# Patient Record
Sex: FEMALE | Race: WHITE
[De-identification: names, ages, dates, MRNs, and addresses within clinical notes are randomized per-mention and may not be internally consistent; named-entity substitution may affect disease eponyms.]

---

## 2017-01-06 ENCOUNTER — HOSPITAL ENCOUNTER (OUTPATIENT)
Dept: HOSPITAL 45 - C.LABPVFM | Age: 77
Discharge: HOME | End: 2017-01-06
Attending: INTERNAL MEDICINE
Payer: COMMERCIAL

## 2017-01-06 DIAGNOSIS — Z00.00: Primary | ICD-10-CM

## 2017-01-06 DIAGNOSIS — R73.03: ICD-10-CM

## 2017-01-06 DIAGNOSIS — E78.00: ICD-10-CM

## 2017-01-06 DIAGNOSIS — I10: ICD-10-CM

## 2017-01-06 LAB
ALBUMIN/GLOB SERPL: 1.5 {RATIO} (ref 0.9–2)
ALP SERPL-CCNC: 45 U/L (ref 45–117)
ALT SERPL-CCNC: 48 U/L (ref 12–78)
ANION GAP SERPL CALC-SCNC: 9 MMOL/L (ref 3–11)
AST SERPL-CCNC: 21 U/L (ref 15–37)
BUN SERPL-MCNC: 19 MG/DL (ref 7–18)
BUN/CREAT SERPL: 22.9 (ref 10–20)
CALCIUM SERPL-MCNC: 8.7 MG/DL (ref 8.5–10.1)
CHLORIDE SERPL-SCNC: 106 MMOL/L (ref 98–107)
CHOLEST/HDLC SERPL: 3.4 {RATIO}
CO2 SERPL-SCNC: 27 MMOL/L (ref 21–32)
CREAT SERPL-MCNC: 0.81 MG/DL (ref 0.6–1.2)
EST. AVERAGE GLUCOSE BLD GHB EST-MCNC: 123 MG/DL
GLOBULIN SER-MCNC: 2.6 GM/DL (ref 2.5–4)
GLUCOSE SERPL-MCNC: 125 MG/DL (ref 70–99)
GLUCOSE UR QL: 41 MG/DL
KETONES UR QL STRIP: 66 MG/DL
NITRITE UR QL STRIP: 168 MG/DL (ref 0–150)
PH UR: 141 MG/DL (ref 0–200)
POTASSIUM SERPL-SCNC: 4.3 MMOL/L (ref 3.5–5.1)
SODIUM SERPL-SCNC: 142 MMOL/L (ref 136–145)
VERY LOW DENSITY LIPOPROT CALC: 34 MG/DL

## 2017-01-10 NOTE — CODING QUERY MEDICAL NECESSITY
SUPPORTING DIAGNOSIS NEEDED





A supporting diagnosis is required for the test/procedure performed on this patient in 
order for us to be reimbursed by the patient's insurance. Please provide a supporting 
diagnosis for the following test/procedure listed below next to the test name along with 
your signature. 



*If there is no additional diagnosis for this patient that would support the following 
test/procedure please document that below next to the test/procedure.



Test(s)/Procedure(s) that require a supporting diagnosis:

DOS 1/6

* Hba1c      DIAGNOSIS:



Provider Signature:  ______________________________  Date:  _______



Thank you  

Parvin Hahn

Health Information Management

Phone:  [phone]

Fax:  [phone]



Once completed, please kindly fax back to [phone]



For questions please call [phone]

## 2017-06-14 ENCOUNTER — HOSPITAL ENCOUNTER (OUTPATIENT)
Dept: HOSPITAL 45 - X.SURG | Age: 77
Discharge: HOME | End: 2017-06-14
Attending: SPECIALIST
Payer: COMMERCIAL

## 2017-06-14 VITALS
WEIGHT: 163.34 LBS | HEIGHT: 65 IN | WEIGHT: 163.34 LBS | HEIGHT: 65 IN | BODY MASS INDEX: 27.21 KG/M2 | BODY MASS INDEX: 27.21 KG/M2

## 2017-06-14 VITALS — DIASTOLIC BLOOD PRESSURE: 75 MMHG | HEART RATE: 67 BPM | OXYGEN SATURATION: 95 % | SYSTOLIC BLOOD PRESSURE: 133 MMHG

## 2017-06-14 VITALS — TEMPERATURE: 97.52 F

## 2017-06-14 DIAGNOSIS — E11.36: ICD-10-CM

## 2017-06-14 DIAGNOSIS — H25.11: Primary | ICD-10-CM

## 2017-06-14 DIAGNOSIS — I10: ICD-10-CM

## 2017-06-14 RX ADMIN — MOXIFLOXACIN HYDROCHLORIDE SCH DROP: 5 SOLUTION/ DROPS OPHTHALMIC at 06:38

## 2017-06-14 RX ADMIN — TROPICAMIDE SCH DROP: 10 SOLUTION/ DROPS OPHTHALMIC at 06:36

## 2017-06-14 RX ADMIN — TROPICAMIDE SCH DROP: 10 SOLUTION/ DROPS OPHTHALMIC at 06:41

## 2017-06-14 RX ADMIN — PHENYLEPHRINE HYDROCHLORIDE SCH DROP: 25 SOLUTION/ DROPS OPHTHALMIC at 06:35

## 2017-06-14 RX ADMIN — MOXIFLOXACIN HYDROCHLORIDE SCH DROP: 5 SOLUTION/ DROPS OPHTHALMIC at 06:47

## 2017-06-14 RX ADMIN — CYCLOPENTOLATE HYDROCHLORIDE SCH DROP: 10 SOLUTION/ DROPS OPHTHALMIC at 06:42

## 2017-06-14 RX ADMIN — CYCLOPENTOLATE HYDROCHLORIDE SCH DROP: 10 SOLUTION/ DROPS OPHTHALMIC at 06:37

## 2017-06-14 RX ADMIN — PHENYLEPHRINE HYDROCHLORIDE SCH DROP: 25 SOLUTION/ DROPS OPHTHALMIC at 06:41

## 2017-06-14 NOTE — ANESTHESIA PROGRESS NT - [facility]
Anesthesia Post Op Note


Date & Time


Jun 14, 2017 at 07:47





Vital Signs


Pain Intensity:  0





Vital Signs Past 12 Hours








  Date Time  Temp Pulse Resp B/P (MAP) Pulse Ox O2 Delivery O2 Flow Rate FiO2


 


6/14/17 07:44  67 16 133/75 (94) 95 Room Air  


 


6/14/17 07:22 36.4 73 16 133/81 (98) 96 Room Air  


 


6/14/17 06:26 37.0 74 18 173/94 (120) 96 Room Air  











Notes


Mental Status:  alert / awake / arousable, participated in evaluation


Pt Amnestic to Procedure:  Yes


Nausea / Vomiting:  adequately controlled


Pain:  adequately controlled


Airway Patency, RR, SpO2:  stable & adequate


BP & HR:  stable & adequate


Hydration State:  stable & adequate


Anesthetic Complications:  no major complications apparent

## 2017-06-14 NOTE — DISCHARGE INSTRUCTIONS-SURGCTR
Discharge Instructions


Date of Service


Jun 14, 2017.





Visit


Reason for Visit:  Cataract Right Eye





Discharge


Discharge Diagnosis / Problem:  lens implant right eye





Discharge Goals


Goal(s):  Improve function





Activity Recommendations


Activity Limitations:  resume your previous activity


Lifting Limitations:  no more than 10 pounds


Exercise/Sports Limitations:  gradually increase as tolerated


May Resume Sexual Activity:  when tolerated


Shower/Bathe:  tomorrow


Driving or Machine Use:  resume 1 day after discharge





Anesthesia


.





Post Anesthesia Instructions:





If you have had General Anesthesia or IV Sedation:





*  Do not drive today.


*  Resume driving when surgeon permits.


*  Do not make important decisions or sign legal documents today.


*  Call surgeon for:





   1.  Temperature elevations greater than 101 degrees F.


   2.  Uncontrollable pain.


   3.  Excessive bleeding.


   4.  Persistent nausea and vomiting.


   5.  Medication intolerance (nausea, vomiting or rash).





*  For nausea and vomiting use only clear liquids such as: tea, soda, bouillon 

until nausea subsides, then gradually increase diet as tolerated.





*  If you have any concerns or questions, call your surgeon's office.  If 

physician is unavailable and it is an emergency, call 911 or go to the nearest 

emergency room.





.





Instructions / Follow-Up


Instructions / Follow-Up





ACTIVITY RECOMMENDATIONS:





*  Light activities.





*  Mild irritation and blurred vision are common for the first few days.





*  You may walk outside, read, watch television.





*  Redness around the white part of the eye is common.








MEDICATIONS:





Resume previous medications unless instructed otherwise by your surgeon.





*  Take white Diamox (Acetazolamide) tablet at 1 pm today.





Start all eye drops at 1 pm today:





*  Eye drops (today and tomorrow):


   Durezol - one drop in operative eye every 3 hours while awake


            Ofloxacin - one drop in operative eye every  3 hours while awake


   Ilevro - one drop in operative eye once a day








SPECIAL CARE INSTRUCTIONS:





*  Tape plastic shield over eye to sleep at night.  





Call your doctor at [phone] with any concerns or problems.








FOLLOW UP VISIT:





Follow-up with Dr Samano at [place] office as scheduled.





Diet Recommendations


Home Diet:  no limitations





Procedures


Procedures Performed:  


cataract extraction with lens implant





Pending Studies


Studies pending at discharge:  no





Medical Emergencies








.


Who to Call and When:





Medical Emergencies:  If at any time you feel your situation is an emergency, 

please call 911 immediately.





.





Non-Emergent Contact


Non-Emergency issues call your:  Ophthalmologist


Call Non-Emergent contact if:  your pain is not controlled


[phone]


.


.








"Provider Documentation" section prepared by Wang Samano.








.

## 2017-06-14 NOTE — [facility] OPERATIVE REPORT
Operative Report


Date of Service


Jun 14, 2017.





Operative Report


1.  PREOPERATIVE DIAGNOSIS:  Senile nuclear cataract, right eye.  





2.  POSTOPERATIVE DIAGNOSIS:  Senile nuclear cataract, right eye.  





3.  PROCEDURE:  Phacoemulsification of right cataract with posterior chamber 

lens implant, type Bausch [employer] Lomb, model MI60L, power +23.5 diopters. 





ANESTHESIA:   Local standby.  SURGEON:  Dr. Samano. COMPLICATIONS:  None.  

OPERATING TIME:  10 minutes.  





4.  OPERATION AND FINDINGS: 





DESCRIPTION OF PROCEDURE:  The right pupil was dilated.  The anesthetic was 

administered using a topical technique.  The right eye was prepped and draped.  

A speculum was placed.  A clear corneal incision was formed.  The chamber was 

filled with Amvisc Plus and Endocoat.  Epinephrine solution was used.  A 

paracentesis was placed.  A capsulorrhexis was performed.  The nucleus was 

hydrodissected.  The lens was removed with phacoemulsification.  Time was 3.86 

seconds.  The aspiration unit was used to remove the cortex.  The capsule was 

filled with Amvisc Plus.  The lens implant was folded and placed into the 

capsule.  The incision was hydrated.  The Amvisc was aspirated.  The wound was 

secure.  The chamber was deep.  The pupil was round.  Brimonidine, TobraDex 

ointment and Vigamox solution were placed.  The speculum was removed.  The 

patient was returned to the Recovery Room in stable condition.


I attest to the content of the Intraoperative Record and any orders documented 

therein.  Any exceptions are noted below.


The scribe's documentation has been prepared in my presence, under my direction 

and personally reviewed by me in its entirety. I confirm that the note above 

accurately reflects all work, treatment, procedures, and medical decision 

making performed by me.








I personally scribed for Wang Samano M.D. ([email or web address]) on 6/14/17 at 07:17.  

Electronically submitted by Shannan Marcano (JOSE DAVID).

## 2017-06-28 ENCOUNTER — HOSPITAL ENCOUNTER (OUTPATIENT)
Dept: HOSPITAL 45 - X.SURG | Age: 77
Discharge: HOME | End: 2017-06-28
Attending: SPECIALIST
Payer: COMMERCIAL

## 2017-06-28 VITALS
BODY MASS INDEX: 27.21 KG/M2 | BODY MASS INDEX: 27.21 KG/M2 | HEIGHT: 65 IN | WEIGHT: 163.34 LBS | HEIGHT: 65 IN | WEIGHT: 163.34 LBS

## 2017-06-28 VITALS — DIASTOLIC BLOOD PRESSURE: 82 MMHG | OXYGEN SATURATION: 96 % | SYSTOLIC BLOOD PRESSURE: 143 MMHG | HEART RATE: 63 BPM

## 2017-06-28 VITALS — TEMPERATURE: 97.88 F

## 2017-06-28 DIAGNOSIS — I10: ICD-10-CM

## 2017-06-28 DIAGNOSIS — H25.12: Primary | ICD-10-CM

## 2017-06-28 DIAGNOSIS — E11.36: ICD-10-CM

## 2017-06-28 RX ADMIN — MOXIFLOXACIN HYDROCHLORIDE SCH DROP: 5 SOLUTION/ DROPS OPHTHALMIC at 06:49

## 2017-06-28 RX ADMIN — CYCLOPENTOLATE HYDROCHLORIDE SCH DROP: 10 SOLUTION/ DROPS OPHTHALMIC at 06:43

## 2017-06-28 RX ADMIN — TROPICAMIDE SCH DROP: 10 SOLUTION/ DROPS OPHTHALMIC at 06:37

## 2017-06-28 RX ADMIN — PHENYLEPHRINE HYDROCHLORIDE SCH DROP: 25 SOLUTION/ DROPS OPHTHALMIC at 06:36

## 2017-06-28 RX ADMIN — CYCLOPENTOLATE HYDROCHLORIDE SCH DROP: 10 SOLUTION/ DROPS OPHTHALMIC at 06:38

## 2017-06-28 RX ADMIN — MOXIFLOXACIN HYDROCHLORIDE SCH DROP: 5 SOLUTION/ DROPS OPHTHALMIC at 06:39

## 2017-06-28 RX ADMIN — TROPICAMIDE SCH DROP: 10 SOLUTION/ DROPS OPHTHALMIC at 06:42

## 2017-06-28 RX ADMIN — PHENYLEPHRINE HYDROCHLORIDE SCH DROP: 25 SOLUTION/ DROPS OPHTHALMIC at 06:41

## 2017-06-28 NOTE — [facility] OPERATIVE REPORT
Operative Report


Date of Service


Jun 28, 2017.





Operative Report


1.  PREOPERATIVE DIAGNOSIS:  Senile nuclear cataract, left eye.  





2.  POSTOPERATIVE DIAGNOSIS:  Senile nuclear cataract, left eye.  





3.  PROCEDURE:  Phacoemulsification of left cataract with posterior chamber 

lens implant, type Bausch [employer] Lomb, model MI60L, power +24.5 diopters. 





ANESTHESIA:   Local standby.  SURGEON:  Dr. Samano. COMPLICATIONS:  None.  

OPERATING TIME:  10 minutes.  





4.  OPERATION AND FINDINGS: 





DESCRIPTION OF PROCEDURE:  The left pupil was dilated.  The anesthetic was 

administered using a topical technique.  The left eye was prepped and draped.  

A speculum was placed.  A clear corneal incision was formed.  The chamber was 

filled with Amvisc Plus and Endocoat.  Epinephrine solution was used.  A 

paracentesis was placed.  A capsulorrhexis was performed.  The nucleus was 

hydrodissected.  The lens was removed with phacoemulsification.  Time was 3.66 

seconds.  The aspiration unit was used to remove the cortex.  The capsule was 

filled with Amvisc Plus.  The lens implant was folded and placed into the 

capsule.  The incision was hydrated.  The Amvisc was aspirated.  The wound was 

secure.  The chamber was deep.  The pupil was round.  Brimonidine, TobraDex 

ointment and Vigamox solution were placed.  The speculum was removed.  The 

patient was returned to the Recovery Room in stable condition.


I attest to the content of the Intraoperative Record and any orders documented 

therein.  Any exceptions are noted below.


The scribe's documentation has been prepared in my presence, under my direction 

and personally reviewed by me in its entirety. I confirm that the note above 

accurately reflects all work, treatment, procedures, and medical decision 

making performed by me.








I personally scribed for Wang Samano M.D. ([email or web address]) on 6/28/17 at 07:12.  

Electronically submitted by Shannan Marcano ([email or web address]).

## 2017-06-28 NOTE — DISCHARGE INSTRUCTIONS-SURGCTR
Discharge Instructions


Date of Service


Jun 28, 2017.





Visit


Reason for Visit:  Cataract Left Eye





Discharge


Discharge Diagnosis / Problem:  lens implant left eye





Discharge Goals


Goal(s):  Improve function





Activity Recommendations


Activity Limitations:  resume your previous activity


Lifting Limitations:  no more than 10 pounds


Exercise/Sports Limitations:  gradually increase as tolerated


May Resume Sexual Activity:  when tolerated


Shower/Bathe:  tomorrow


Driving or Machine Use:  resume 1 day after discharge





Anesthesia


.





Post Anesthesia Instructions:





If you have had General Anesthesia or IV Sedation:





*  Do not drive today.


*  Resume driving when surgeon permits.


*  Do not make important decisions or sign legal documents today.


*  Call surgeon for:





   1.  Temperature elevations greater than 101 degrees F.


   2.  Uncontrollable pain.


   3.  Excessive bleeding.


   4.  Persistent nausea and vomiting.


   5.  Medication intolerance (nausea, vomiting or rash).





*  For nausea and vomiting use only clear liquids such as: tea, soda, bouillon 

until nausea subsides, then gradually increase diet as tolerated.





*  If you have any concerns or questions, call your surgeon's office.  If 

physician is unavailable and it is an emergency, call 911 or go to the nearest 

emergency room.





.





Instructions / Follow-Up


Instructions / Follow-Up





ACTIVITY RECOMMENDATIONS:





*  Light activities.





*  Mild irritation and blurred vision are common for the first few days.





*  You may walk outside, read, watch television.





*  Redness around the white part of the eye is common.








MEDICATIONS:





Resume previous medications unless instructed otherwise by your surgeon.





*  Take white Diamox (Acetazolamide) tablet at 1 pm today.





Start all eye drops at 1 pm today:





*  Eye drops (today and tomorrow):


   Durezol - one drop in operative eye every 3 hours while awake


            Ofloxacin - one drop in operative eye every  3 hours while awake


   


SPECIAL CARE INSTRUCTIONS:





*  Tape plastic shield over eye to sleep at night.  





Call your doctor at [phone] with any concerns or problems.








FOLLOW UP VISIT:





Follow-up with Dr Samano at [facility] as scheduled.





Diet Recommendations


Home Diet:  no limitations





Procedures


Procedures Performed:  


Left Cataract Phacoemulsification With Intraocular Lens Implant





Pending Studies


Studies pending at discharge:  no





Medical Emergencies








.


Who to Call and When:





Medical Emergencies:  If at any time you feel your situation is an emergency, 

please call 911 immediately.





.





Non-Emergent Contact


Non-Emergency issues call your:  Ophthalmologist


Call Non-Emergent contact if:  your pain is not controlled


[phone]


.


.








"Provider Documentation" section prepared by Wang Samano.








.

## 2017-06-28 NOTE — ANESTHESIA PROGRESS NT - [facility]
Anesthesia Post Op Note


Date & Time


Jun 28, 2017 at 07:25





Vital Signs


Pain Intensity:  0





Vital Signs Past 12 Hours








  Date Time  Temp Pulse Resp B/P (MAP) Pulse Ox O2 Delivery O2 Flow Rate FiO2


 


6/28/17 07:14 36.6 73 16 133/84 (100) 95 Room Air  


 


6/28/17 06:26 36.9 79 16 168/90 (116) 95 Room Air  











Notes


Mental Status:  alert / awake / arousable, participated in evaluation


Pt Amnestic to Procedure:  Yes


Nausea / Vomiting:  adequately controlled


Pain:  adequately controlled


Airway Patency, RR, SpO2:  stable & adequate


BP & HR:  stable & adequate


Hydration State:  stable & adequate


Anesthetic Complications:  no major complications apparent

## 2017-07-31 ENCOUNTER — HOSPITAL ENCOUNTER (OUTPATIENT)
Dept: HOSPITAL 45 - C.GI | Age: 77
Discharge: HOME | End: 2017-07-31
Attending: INTERNAL MEDICINE
Payer: COMMERCIAL

## 2017-07-31 VITALS
BODY MASS INDEX: 27.21 KG/M2 | HEIGHT: 65 IN | HEIGHT: 65 IN | WEIGHT: 163.34 LBS | WEIGHT: 163.34 LBS | BODY MASS INDEX: 27.21 KG/M2

## 2017-07-31 VITALS — SYSTOLIC BLOOD PRESSURE: 132 MMHG | DIASTOLIC BLOOD PRESSURE: 79 MMHG | HEART RATE: 67 BPM | OXYGEN SATURATION: 96 %

## 2017-07-31 VITALS — TEMPERATURE: 97.7 F

## 2017-07-31 DIAGNOSIS — D12.3: ICD-10-CM

## 2017-07-31 DIAGNOSIS — F41.9: ICD-10-CM

## 2017-07-31 DIAGNOSIS — Z12.11: Primary | ICD-10-CM

## 2017-07-31 DIAGNOSIS — I10: ICD-10-CM

## 2017-07-31 DIAGNOSIS — K64.8: ICD-10-CM

## 2017-07-31 DIAGNOSIS — Z79.82: ICD-10-CM

## 2017-07-31 DIAGNOSIS — Z87.891: ICD-10-CM

## 2017-07-31 DIAGNOSIS — R73.03: ICD-10-CM

## 2017-07-31 DIAGNOSIS — E78.5: ICD-10-CM

## 2017-07-31 NOTE — ANESTHESIOLOGY PROGRESS NOTE
Anesthesia Post Op Note


Date & Time


Jul 31, 2017 at 14:37





Vital Signs


Pain Intensity:  0





Vital Signs Past 12 Hours








  Date Time  Temp Pulse Resp B/P (MAP) Pulse Ox O2 Delivery O2 Flow Rate FiO2


 


7/31/17 14:26  62 16 155/78 (103) 93 Room Air  


 


7/31/17 14:11  65 16 136/73 (94) 94 Room Air  


 


7/31/17 13:17 36.5 71 18 134/79 (97) 95 Room Air  











Notes


Mental Status:  alert / awake / arousable, participated in evaluation


Pt Amnestic to Procedure:  Yes


Nausea / Vomiting:  adequately controlled


Pain:  adequately controlled


Airway Patency, RR, SpO2:  stable & adequate


BP & HR:  stable & adequate


Hydration State:  stable & adequate


Anesthetic Complications:  no major complications apparent

## 2017-07-31 NOTE — GI REPORT
Procedure Date: 7/31/2017 1:13 PM

Procedure:            Colonoscopy

Indications:          Screening for colorectal malignant neoplasm

Medicines:            Monitored Anesthesia Care

Complications:        No immediate complications.

Estimated Blood Loss: Estimated blood loss: none.

Procedure:            Pre-Anesthesia Assessment:

                      - Prior to the procedure, a History and Physical was 

                      performed, and patient medications and allergies were 

                      reviewed. The patient's tolerance of previous 

                      anesthesia was also reviewed. The risks and benefits of 

                      the procedure and the sedation options and risks were 

                      discussed with the patient. All questions were 

                      answered, and informed consent was obtained. Prior 

                      Anticoagulants: The patient has taken aspirin, last 

                      dose was 1 day prior to procedure. ASA Grade 

                      Assessment: III - A patient with severe systemic 

                      disease. After reviewing the risks and benefits, the 

                      patient was deemed in satisfactory condition to undergo 

                      the procedure.

                      After I obtained informed consent, the scope was passed 

                      under direct vision. Throughout the procedure, the 

                      patient's blood pressure, pulse, and oxygen saturations 

                      were monitored continuously. The scope was introduced 

                      through the anus and advanced to the terminal ileum. 

                      The colonoscopy was performed without difficulty. The 

                      patient tolerated the procedure well. The quality of 

                      the bowel preparation was good. The ileocecal valve, 

                      appendiceal orifice, and rectum were photographed.

Findings:

     Two sessile polyps were found in the transverse colon. The polyps were 4 

     to 6 mm in size. These polyps were removed with a hot snare. Resection 

     and retrieval were complete.

     Non-bleeding internal hemorrhoids were found during retroflexion. The 

     hemorrhoids were small.

Impression:           - Two 4 to 6 mm polyps in the transverse colon, removed 

                      with a hot snare. Resected and retrieved.

                      - Non-bleeding internal hemorrhoids.

Recommendation:       - Resume previous diet.

                      - Continue present medications.

                      - Await pathology results.

                      - Repeat colonoscopy for surveillance based on 

                      pathology results.

                      - Return to primary care physician as previously 

                      scheduled.

Milton Arellano DO

7/31/2017 2:17:47 PM

This report has been signed electronically.

Note Initiated On: 7/31/2017 1:13 PM

     I attest to the content of the Intraoperative Record and orders 

     documented therein, exceptions below

## 2017-07-31 NOTE — ENDO HISTORY AND PHYSICAL
History & Physical


Date of Service:


Jul 31, 2017.


Chief Complaint:


screening


Referring Physician:


Dr. Jauregui


History of Present Illness


77 yo CF who presents for screening colonoscopy.





Past Surgical History


Hx Cardiac Surgery:  No


Hx Internal Defibrillator:  No


Hx Pacemaker:  No


Hx Abdominal Surgery:  Yes (BILATERAL TUBAL LIGATION)


Hx of Implantable Prosthesis:  No


Hx Post-Op Nausea and Vomiting:  No


Hx Cancer Surgery:  No


Hx Thoracic Surgery:  No


Hx Orthopedic:  Yes (LEFT WRIST FX REPAIR)


Hx Urinary Tract Surgery:  No





Family History


None





Social History


Smoking Status:  Former Smoker


Hx Substance Use:  No


Hx Alcohol Use:  Yes (RARELY WILL DRINK WINE)





Allergies


Coded Allergies:  


     No Known Allergies (Unverified , 7/31/17)





Current Medications





Reported Home Medications








 Medications  Dose


 Route/Sig


 Max Daily Dose Days Date Category


 


 [Zinc]    1 Tab


 PO QAM


    6/7/17 Reported


 


 [Vitamin C]    1 Tab


 PO QAM


    6/7/17 Reported


 


 Vitamin D


  (Cholecalciferol)


 2,000 Unit Cap  1 Cap


 PO QAM


    6/7/17 Reported


 


 Simvastatin 40 Mg


 Tab  1 Tab


 PO HS


    6/7/17 Reported


 


 Preservision


 Areds (Multiple


 Vitamins W/


 Minerals) 1 Cap


 Cap  1 Cap


 PO BID


    6/7/17 Reported


 


 Omega-3 (Fish


 Oil) 1 Ea Cap  1 Cap


 PO BID


    6/7/17 Reported


 


 Multivitamin


  (Multivitamins)


 Tab  1 Tab


 PO QAM


    6/7/17 Reported


 


 Glucophage


  (Metformin Hcl)


 500 Mg Tab  0.5 Tab


 PO QAM


    6/7/17 Reported


 


 Meclizine Hcl 25


 Mg Tab  1 Tab


 PO TID PRN


    6/7/17 Reported


 


 Normodyne


  (Labetalol HCl)


 300 Mg Tab  300 Mg


 PO BID


    6/7/17 Reported


 


 Calcium (Calcium


 Carbonate) 600 Mg


 Tab  1 Tab


 PO QAM


    6/7/17 Reported


 


 Vitamin B Complex


  (B-Complex


 Vitamins) 1 Tab


 Tab  2 Tab


 PO QAM


    6/7/17 Reported


 


 Aspirin Chewable


  (Aspirin) 81 Mg


 Chew  81 Mg


 PO QAM


    6/7/17 Reported











Vital Signs


Weight (Kilograms):  74.09


Height (Feet):  5


Height (Inches):  5











  Date Time  Temp Pulse Resp B/P (MAP) Pulse Ox O2 Delivery O2 Flow Rate FiO2


 


7/31/17 13:17 36.5 71 18 134/79 (97) 95 Room Air  











Physical Exam


General Appearance:  WD/WN, no apparent distress


Respiratory/Chest:  


   Auscultation:  breath sounds normal


Cardiovascular:  


   Heart Auscultation:  RRR


Abdomen:  


   Bowel Sounds:  normal


   Inspection & Palpation:  soft, non-distended, no tenderness, guarding & 

rebound





Assessment and Plan


Assessment:


77 yo CF who presents for screening colonoscopy.








Plan:


Proceed with colonoscopy.

## 2017-07-31 NOTE — DISCHARGE INSTRUCTIONS
Endoscopy Patient Instructions


Date / Procedure(s) Performed


Jul 31, 2017.


Colonoscopy





Allergy Information


Coded Allergies:  


     No Known Allergies (Unverified , 7/31/17)





Discharge Date / Findings


Jul 31, 2017.


Colon polyps


Internal hemorrhoids





Medication Instructions


OK to resume all medications today as prescribed





Reported Home Medications








 Medications  Dose


 Route/Sig


 Max Daily Dose Days Date Category


 


 [Zinc]    1 Tab


 PO QAM


    6/7/17 Reported


 


 [Vitamin C]    1 Tab


 PO QAM


    6/7/17 Reported


 


 Vitamin D


  (Cholecalciferol)


 2,000 Unit Cap  1 Cap


 PO QAM


    6/7/17 Reported


 


 Simvastatin 40 Mg


 Tab  1 Tab


 PO HS


    6/7/17 Reported


 


 Preservision


 Areds (Multiple


 Vitamins W/


 Minerals) 1 Cap


 Cap  1 Cap


 PO BID


    6/7/17 Reported


 


 Omega-3 (Fish


 Oil) 1 Ea Cap  1 Cap


 PO BID


    6/7/17 Reported


 


 Multivitamin


  (Multivitamins)


 Tab  1 Tab


 PO QAM


    6/7/17 Reported


 


 Glucophage


  (Metformin Hcl)


 500 Mg Tab  0.5 Tab


 PO QAM


    6/7/17 Reported


 


 Meclizine Hcl 25


 Mg Tab  1 Tab


 PO TID PRN


    6/7/17 Reported


 


 Normodyne


  (Labetalol HCl)


 300 Mg Tab  300 Mg


 PO BID


    6/7/17 Reported


 


 Calcium (Calcium


 Carbonate) 600 Mg


 Tab  1 Tab


 PO QAM


    6/7/17 Reported


 


 Vitamin B Complex


  (B-Complex


 Vitamins) 1 Tab


 Tab  2 Tab


 PO QAM


    6/7/17 Reported


 


 Aspirin Chewable


  (Aspirin) 81 Mg


 Chew  81 Mg


 PO QAM


    6/7/17 Reported











Provider Instructions





Activity Restrictions





-  No exercising or heavy lifting for 24 hours. 


-  Do not drink alcohol the day of the procedure.


-  Do not drive a car or operate machinery until the day after the procedure.


-  Do not make any important decisions or sign important papers in 24 hours 

after the procedure.





Following Day:





-  Return to full activity which may include returning to work/school.





Diet





Start your diet with liquids and light foods (jello, soup, juice, toast).  Then 

eat your usual diet if not nauseated.





Treatment For Common After Affects





For mild abdominal pain, bloating, or excessive gas:





-  Rest


-  Eat lightly


-  Lie on right side





Follow-Up Information


Follow-up with Dr. Jauregui as scheduled





Anesthesia Information





What You Should Know





You have had a procedure that required some medicine to reduce anxiety and 

discomfort. This treatment is called moderate sedation.  


After receiving the treatment, you may be sleepy, but you will be able to 

breathe on your own.  The effects of the treatment may last for several hours.








Follow these instructions along with Activity/Diet recommendations noted above:





*  Do NOT do anything where dizziness or clumsiness would be dangerous.





*  Rest quietly at home today, then you can be up and about tomorrow.





*  Have a responsible person stay with you the rest of today.





*  You may have had an I.V. today.  If so, you may take the dressing off later 

today.





Recommendations


 


Call your doctor if:





*  Trouble breathing 





*  Continuous vomiting for more than 24 hours








*  Temperature above 101 degrees





*  Severe abdominal pain or bloating





*  Pain not relieved by pain medicine ordered





*  There is increased drainage or redness from any incision





*  A large amount of rectal bleeding greater than 2-3 tablespoons. 


   (If you had a polyp/s removed or have hemorrhoids, a small amount of blood -


    from the rectum is to be expected.)





*  You have any unanswered questions or concerns.








IN THE EVENT OF A SERIOUS EMERGENCY, GO TO THE NEAREST EMERGENCY ROOM








       Your discharge instructions were prepared by provider Milton Arellano.





 Patient Instructions Signature Page














Marce Mathew 











Patient (or Guardian) Signature/Date:____________________________________ I 

have read and understand the instructions given to me by my caregivers.








Caregiver/RN/Doctor Signature/Date:____________________________________











The above-named patient and/or guardian has received patient instructions on 

this date.





























+  Original Patient Signature Page (only) stays with chart.  Please make copy 

for patient.

## 2017-09-06 ENCOUNTER — HOSPITAL ENCOUNTER (OUTPATIENT)
Dept: HOSPITAL 45 - C.LABPVFM | Age: 77
Discharge: HOME | End: 2017-09-06
Attending: NURSE PRACTITIONER
Payer: COMMERCIAL

## 2017-09-06 DIAGNOSIS — E55.9: ICD-10-CM

## 2017-09-06 DIAGNOSIS — E78.00: ICD-10-CM

## 2017-09-06 DIAGNOSIS — R73.03: ICD-10-CM

## 2017-09-06 DIAGNOSIS — I10: ICD-10-CM

## 2017-09-06 DIAGNOSIS — M81.0: Primary | ICD-10-CM

## 2017-09-06 LAB
ANION GAP SERPL CALC-SCNC: 7 MMOL/L (ref 3–11)
BUN SERPL-MCNC: 16 MG/DL (ref 7–18)
BUN/CREAT SERPL: 18.7 (ref 10–20)
CALCIUM SERPL-MCNC: 9.3 MG/DL (ref 8.5–10.1)
CHLORIDE SERPL-SCNC: 107 MMOL/L (ref 98–107)
CHOLEST/HDLC SERPL: 3.6 {RATIO}
CO2 SERPL-SCNC: 27 MMOL/L (ref 21–32)
CREAT SERPL-MCNC: 0.88 MG/DL (ref 0.6–1.2)
EST. AVERAGE GLUCOSE BLD GHB EST-MCNC: 123 MG/DL
GLUCOSE SERPL-MCNC: 131 MG/DL (ref 70–99)
GLUCOSE UR QL: 50 MG/DL
KETONES UR QL STRIP: 75 MG/DL
NITRITE UR QL STRIP: 264 MG/DL (ref 0–150)
PH UR: 178 MG/DL (ref 0–200)
POTASSIUM SERPL-SCNC: 4.2 MMOL/L (ref 3.5–5.1)
SODIUM SERPL-SCNC: 141 MMOL/L (ref 136–145)
VERY LOW DENSITY LIPOPROT CALC: 53 MG/DL

## 2017-10-23 ENCOUNTER — HOSPITAL ENCOUNTER (OUTPATIENT)
Dept: HOSPITAL 45 - C.MAMM | Age: 77
Discharge: HOME | End: 2017-10-23
Attending: NURSE PRACTITIONER
Payer: COMMERCIAL

## 2017-10-23 DIAGNOSIS — Z12.31: Primary | ICD-10-CM

## 2017-10-23 NOTE — MAMMOGRAPHY REPORT
BILATERAL DIGITAL SCREENING MAMMOGRAM WITH CAD: 10/23/2017

CLINICAL HISTORY: Routine screening examination.  





TECHNIQUE: Bilateral CC, MLO and repeat right MLO views were obtained.  Current study was also evalua
renee with a Computer Aided Detection (CAD) system.  



COMPARISON: Comparison is made to exams dated:  10/21/2016 mammogram, 10/19/2015 mammogram, 10/16/201
4 mammogram, 10/15/2013 mammogram, 10/25/2012 mammogram, and 10/25/2011 mammogram - [facility].   



BREAST COMPOSITION:  The tissue of both breasts is almost entirely fatty.  



FINDINGS: There is evidence of prior reduction mammoplasty.  No suspicious mass, architectural distor
tion or cluster of microcalcifications is seen.  



IMPRESSION:  ACR BI-RADS CATEGORY 1: NEGATIVE

There is no mammographic evidence of malignancy. A 1 year screening mammogram is recommended.  The pa
tient will receive written notification of the results.  





Approximately 10% of breast cancers are not detected with mammography. A negative mammographic report
 should not delay biopsy if a clinically suggestive mass is present.



Payton Gill M.D.          

ay/:10/23/2017 13:21:37  



Imaging Technologist: Leann ALONSO(R)(M), [facility]

letter sent: Normal 1/2  

BI-RADS Code: ACR BI-RADS Category 1: Negative

## 2018-02-08 ENCOUNTER — HOSPITAL ENCOUNTER (OUTPATIENT)
Dept: HOSPITAL 45 - C.LABPVFM | Age: 78
Discharge: HOME | End: 2018-02-08
Attending: NURSE PRACTITIONER
Payer: COMMERCIAL

## 2018-02-08 DIAGNOSIS — R73.03: ICD-10-CM

## 2018-02-08 DIAGNOSIS — I10: Primary | ICD-10-CM

## 2018-02-08 LAB
BUN SERPL-MCNC: 19 MG/DL (ref 7–18)
CALCIUM SERPL-MCNC: 9.7 MG/DL (ref 8.5–10.1)
CO2 SERPL-SCNC: 28 MMOL/L (ref 21–32)
CREAT SERPL-MCNC: 0.83 MG/DL (ref 0.6–1.2)
GLUCOSE SERPL-MCNC: 140 MG/DL (ref 70–99)
HBA1C MFR BLD: 6.1 % (ref 4.5–5.6)
POTASSIUM SERPL-SCNC: 4.4 MMOL/L (ref 3.5–5.1)
SODIUM SERPL-SCNC: 139 MMOL/L (ref 136–145)

## 2018-08-14 ENCOUNTER — HOSPITAL ENCOUNTER (OUTPATIENT)
Dept: HOSPITAL 45 - C.LABPVFM | Age: 78
Discharge: HOME | End: 2018-08-14
Attending: NURSE PRACTITIONER
Payer: COMMERCIAL

## 2018-08-14 DIAGNOSIS — R73.03: ICD-10-CM

## 2018-08-14 DIAGNOSIS — E78.00: ICD-10-CM

## 2018-08-14 DIAGNOSIS — I10: Primary | ICD-10-CM

## 2018-08-14 LAB
BUN SERPL-MCNC: 21 MG/DL (ref 7–18)
CALCIUM SERPL-MCNC: 9.3 MG/DL (ref 8.5–10.1)
CO2 SERPL-SCNC: 27 MMOL/L (ref 21–32)
CREAT SERPL-MCNC: 0.88 MG/DL (ref 0.6–1.2)
GLUCOSE SERPL-MCNC: 143 MG/DL (ref 70–99)
HBA1C MFR BLD: 6.1 % (ref 4.5–5.6)
KETONES UR QL STRIP: 50 MG/DL
PH UR: 160 MG/DL (ref 0–200)
POTASSIUM SERPL-SCNC: 4.4 MMOL/L (ref 3.5–5.1)
SODIUM SERPL-SCNC: 138 MMOL/L (ref 136–145)